# Patient Record
Sex: FEMALE | Race: OTHER
[De-identification: names, ages, dates, MRNs, and addresses within clinical notes are randomized per-mention and may not be internally consistent; named-entity substitution may affect disease eponyms.]

---

## 2019-10-21 LAB
ANION GAP SERPL CALCULATED.3IONS-SCNC: 5 MMOL/L (ref 5–15)
APTT PPP: 25.6 SEC (ref 23.64–32.05)
BUN SERPL-MCNC: 11 MG/DL (ref 7–18)
BUN/CREAT SERPL: 12.6
CALCIUM SERPL-MCNC: 8.5 MG/DL (ref 8.5–10.1)
CHLORIDE SERPL-SCNC: 107 MMOL/L (ref 98–107)
CO2 SERPL-SCNC: 30 MMOL/L (ref 21–32)
GLUCOSE SERPL-MCNC: 95 MG/DL (ref 74–106)
HCT VFR BLD AUTO: 32.1 % (ref 36–46)
HGB BLD-MCNC: 11 G/DL (ref 12.2–16.2)
INR PPP: 0.93 (ref 0.9–1.15)
MCH RBC QN AUTO: 32.8 PG (ref 28–32)
MCV RBC AUTO: 95.6 FL (ref 80–100)
NRBC BLD QL AUTO: 0 %
POTASSIUM SERPL-SCNC: 3.9 MMOL/L (ref 3.5–5.1)
SODIUM SERPL-SCNC: 142 MMOL/L (ref 136–145)

## 2019-10-24 ENCOUNTER — HOSPITAL ENCOUNTER (INPATIENT)
Dept: HOSPITAL 15 - SUR | Age: 69
LOS: 2 days | Discharge: HOME | DRG: 743 | End: 2019-10-26
Attending: OBSTETRICS & GYNECOLOGY | Admitting: OBSTETRICS & GYNECOLOGY
Payer: COMMERCIAL

## 2019-10-24 VITALS — DIASTOLIC BLOOD PRESSURE: 75 MMHG | SYSTOLIC BLOOD PRESSURE: 160 MMHG

## 2019-10-24 VITALS — BODY MASS INDEX: 29.22 KG/M2 | WEIGHT: 164.91 LBS | HEIGHT: 63 IN

## 2019-10-24 DIAGNOSIS — Z79.899: ICD-10-CM

## 2019-10-24 DIAGNOSIS — Z88.8: ICD-10-CM

## 2019-10-24 DIAGNOSIS — I10: ICD-10-CM

## 2019-10-24 DIAGNOSIS — Z90.711: ICD-10-CM

## 2019-10-24 DIAGNOSIS — N81.4: Primary | ICD-10-CM

## 2019-10-24 DIAGNOSIS — Z88.6: ICD-10-CM

## 2019-10-24 DIAGNOSIS — K21.9: ICD-10-CM

## 2019-10-24 DIAGNOSIS — N73.6: ICD-10-CM

## 2019-10-24 PROCEDURE — 85025 COMPLETE CBC W/AUTO DIFF WBC: CPT

## 2019-10-24 PROCEDURE — 85730 THROMBOPLASTIN TIME PARTIAL: CPT

## 2019-10-24 PROCEDURE — 0DNW4ZZ RELEASE PERITONEUM, PERCUTANEOUS ENDOSCOPIC APPROACH: ICD-10-PCS | Performed by: OBSTETRICS & GYNECOLOGY

## 2019-10-24 PROCEDURE — 36415 COLL VENOUS BLD VENIPUNCTURE: CPT

## 2019-10-24 PROCEDURE — 0UT24ZZ RESECTION OF BILATERAL OVARIES, PERCUTANEOUS ENDOSCOPIC APPROACH: ICD-10-PCS | Performed by: OBSTETRICS & GYNECOLOGY

## 2019-10-24 PROCEDURE — 8E0W4CZ ROBOTIC ASSISTED PROCEDURE OF TRUNK REGION, PERCUTANEOUS ENDOSCOPIC APPROACH: ICD-10-PCS | Performed by: OBSTETRICS & GYNECOLOGY

## 2019-10-24 PROCEDURE — 85610 PROTHROMBIN TIME: CPT

## 2019-10-24 PROCEDURE — 80048 BASIC METABOLIC PNL TOTAL CA: CPT

## 2019-10-24 PROCEDURE — 86900 BLOOD TYPING SEROLOGIC ABO: CPT

## 2019-10-24 PROCEDURE — 0UT74ZZ RESECTION OF BILATERAL FALLOPIAN TUBES, PERCUTANEOUS ENDOSCOPIC APPROACH: ICD-10-PCS | Performed by: OBSTETRICS & GYNECOLOGY

## 2019-10-24 PROCEDURE — 87086 URINE CULTURE/COLONY COUNT: CPT

## 2019-10-24 PROCEDURE — 86901 BLOOD TYPING SEROLOGIC RH(D): CPT

## 2019-10-24 PROCEDURE — 81001 URINALYSIS AUTO W/SCOPE: CPT

## 2019-10-24 PROCEDURE — 0USG4ZZ REPOSITION VAGINA, PERCUTANEOUS ENDOSCOPIC APPROACH: ICD-10-PCS | Performed by: OBSTETRICS & GYNECOLOGY

## 2019-10-24 PROCEDURE — 0UT94ZL RESECTION OF UTERUS, SUPRACERVICAL, PERCUTANEOUS ENDOSCOPIC APPROACH: ICD-10-PCS | Performed by: OBSTETRICS & GYNECOLOGY

## 2019-10-24 PROCEDURE — 86850 RBC ANTIBODY SCREEN: CPT

## 2019-10-24 RX ADMIN — SODIUM CHLORIDE SCH MLS/HR: 0.9 INJECTION, SOLUTION INTRAVENOUS at 23:26

## 2019-10-24 NOTE — NUR
Received patient in bed sleeping with eyes closed, responds to verbal stimuli, however, pt 
will go back to sleep and close eyes immediately. Patient's respiration even and unlabored, 
no non verbal cues to pain noted and observed. Patient with 5 abdominal surgical incision 
with dressing, clean dry and intact. Ennis cath draining minimal amount of clear yellow 
urine with no foul odor. SCD machine to bilateral lower extremities tolerating well. Will 
continue to monitor.

## 2019-10-25 VITALS — DIASTOLIC BLOOD PRESSURE: 64 MMHG | SYSTOLIC BLOOD PRESSURE: 115 MMHG

## 2019-10-25 VITALS — DIASTOLIC BLOOD PRESSURE: 54 MMHG | SYSTOLIC BLOOD PRESSURE: 119 MMHG

## 2019-10-25 VITALS — SYSTOLIC BLOOD PRESSURE: 126 MMHG | DIASTOLIC BLOOD PRESSURE: 62 MMHG

## 2019-10-25 VITALS — DIASTOLIC BLOOD PRESSURE: 65 MMHG | SYSTOLIC BLOOD PRESSURE: 117 MMHG

## 2019-10-25 VITALS — DIASTOLIC BLOOD PRESSURE: 59 MMHG | SYSTOLIC BLOOD PRESSURE: 117 MMHG

## 2019-10-25 RX ADMIN — SODIUM CHLORIDE SCH MLS/HR: 0.9 INJECTION, SOLUTION INTRAVENOUS at 22:54

## 2019-10-25 RX ADMIN — ONDANSETRON HYDROCHLORIDE PRN MG: 2 INJECTION, SOLUTION INTRAMUSCULAR; INTRAVENOUS at 17:56

## 2019-10-25 RX ADMIN — SODIUM CHLORIDE SCH MLS/HR: 0.9 INJECTION, SOLUTION INTRAVENOUS at 06:09

## 2019-10-25 RX ADMIN — ONDANSETRON HYDROCHLORIDE PRN MG: 2 INJECTION, SOLUTION INTRAMUSCULAR; INTRAVENOUS at 10:04

## 2019-10-25 NOTE — NUR
PATIENT AMBULATED APPROXIMATELY 50 FEET WITH WALKER. PATIENT TOLERATED WELL. NO S/S OF 
DISTRESS NOTED AT TIME.

## 2019-10-25 NOTE — NUR
PATIENT PROVIDED WITH INCENTIVE SPIROMETER. PATIENT INSTRUCTED ON PROPER USE. RETURN 
DEMONSTRATION CORRECTLY. WILL CONTINUE CARE.

## 2019-10-25 NOTE — NUR
IV insertion

IV access obtained, via clean sterile technique by inserting 20 gauge catheter at RFA after 
1 attempt(s). IV secured properly. No trauma to site. Patient tolerated well.

NOTE:

## 2019-10-25 NOTE — NUR
Bladder scanner done with charge nurse at bedside. 0 mls in bladder scanner. Patient's 
abdomen soft and non distended. Will continue to monitor.

## 2019-10-25 NOTE — NUR
NS 0.9 500MLS ADMINISTERED BY PREVIOUS SHIFT DONE. ENCOURAGED PATIENT TO VOID. HOWEVER, 
PATIENT STATED UNABLE. ENCOURAGED PATIENT TO DRINK FLUIDS, HOWEVER, PATIENT STATED FEELING 
NAUSEOUS. ZOFRAN ALREADY GIVEN BY PRIOR SHIFT RN AND NOT DUE AT THIS TIME. OFFERED PATIENT 
ICE CHIPS. WILL CONTINUE TO MONITOR.

## 2019-10-25 NOTE — NUR
Patient in bed alert and oriented x 4 verbally coherent able to make needs known. Patient's 
respiration even and unlabored, denies pain and discomfort at this time. Plan of care 
discussed, patient verbalized understanding. All needs attended, will continue to monitor.

-------------------------------------------------------------------------------

Addendum: 10/25/19 at 2251 by Elda Mahoney RN

-------------------------------------------------------------------------------

[id number]

## 2019-10-25 NOTE — NUR
Neves catheter dc'd

Order to discontinue neves catheter.  Neves dc'd  with clean technique following deflation 
of balloon. Patient tolerated well with no complaints of pain. Continue care.

## 2019-10-25 NOTE — NUR
PATIENT HAS NOT URINATED SINCE BRODERICK CATHETER D/C. BLADDER SCAN DONE WITH 0MLS FOUND IN 
BLADDER. NO DISTENTION NOTED BY THIS RN. INFORMED ZEFERINO SKINNER OF FINDINGS. RECEIVED NEW 
ORDERS. TORAFSHAN. WILL FOLLOW THROUGH.

## 2019-10-25 NOTE — NUR
ENCOURAGED PATIENT TO VOID. ASSISTED PATIENT TO BATHROOM WITH WALKER. PT SEATED AT THE 
TOILET FOR 10 MINUTES WITH NO URINE OUTPUT. ASSISTED PATIENT BACK TO BED. PATIENT'S ABDOMEN 
SOFT AND NON DISTENDED. WILL CONTINUE TO MONITOR.

## 2019-10-26 VITALS — SYSTOLIC BLOOD PRESSURE: 129 MMHG | DIASTOLIC BLOOD PRESSURE: 62 MMHG

## 2019-10-26 VITALS — DIASTOLIC BLOOD PRESSURE: 67 MMHG | SYSTOLIC BLOOD PRESSURE: 127 MMHG

## 2019-10-26 NOTE — NUR
PER PATIENT, SHE HAS PRESCRIPTIONS FOR ANTIBIOTICS AND PAIN MEDICINE AT HOME THAT WERE 
PROVIDED PRIOR TO PROCEDURE.

## 2019-10-26 NOTE — NUR
SPOKE WITH ZEFERINO SKINNER. INFORMED OF TEMPERATURE .4 FROM NIGHT SHIFT AND OF NEW 
TEMPERATURE OF 98.5 THIS AM. STATED OK TO CONTINUE WITH DC AND TO INFORM PATIENT TO CONTINUE 
TAKING TEMPERATURE AT HOME. WILL FOLLOW THROUGH.

## 2019-10-26 NOTE — NUR
Informed patient will be doing bladder scan again to check for volume, however patient 
stated " I think I have the urge to urinate" Assisted patient to the bathroom with walker. 
After 10-15 minutes sitting on the toilet, patient stated was able to urinate "but not a 
lot, approximately 100 to 150 mls. Instructed patient not to flush after voiding. Noted 
minimal amount of clear straw colored urine with no sediments or foul odor. Patient further 
reported no pain during urination, however, tenderness when pressure applied to abdomen. 
Still awaiting MD's call back. Will endorse to am shift RN.

## 2019-10-26 NOTE — NUR
PATIENT RESTING IN BED. NO COMPLAINTS OF PAIN AT THIS TIME. PATIENT STATS SHE URINATED AT 
THIS TIME. NO S/S OF DISTRESS NOTED AT THIS TIME. PATIENT REPORTS FEELING MUCH BETTER. NO 
DRAINAGE TO SURGICAL SITES. PATIENT UPDATED ON POC. ALL QUESTIONS ANSWERED, BED IN LOWEST 
LOCKED POSITION WITH CALL LIGHT WITHIN REACH.

## 2019-10-26 NOTE — NUR
Patient with temp of 100.4. Acetaminophen given as ordered. Pt with no urine output as of 
this time. Encouraged pt to urinate, however, patient stated " I can't". Reassessed abdomen, 
soft, non tender and non distended. Pt with no complaints of pain. Paged Dr. Torres via 
exchange, awaiting call back.

## 2019-10-26 NOTE — NUR
Discharge instructions given as ordered. Encourage to follow up with PMD as instructed. 
Instructed to follow up with ZEFERINO Ortiz in 2-3weeks. Informed to shower daily per 
surgeon request. Informed to monitor temperature as well.  All questions and concerns 
addressed. Patient verbalized understanding. IV removed with catheter intact, pressure 
dressing applied. Patient taken to vehicle via wheelchair with all personal belongings, 
accompanied by staff and family member. No distress noted at time of departure.